# Patient Record
Sex: FEMALE | Race: WHITE
[De-identification: names, ages, dates, MRNs, and addresses within clinical notes are randomized per-mention and may not be internally consistent; named-entity substitution may affect disease eponyms.]

---

## 2017-01-06 ENCOUNTER — HOSPITAL ENCOUNTER (OUTPATIENT)
Age: 67
Discharge: HOME | End: 2017-01-06
Payer: MEDICARE

## 2017-01-06 DIAGNOSIS — Z12.31: Primary | ICD-10-CM

## 2017-06-02 ENCOUNTER — HOSPITAL ENCOUNTER (OUTPATIENT)
Dept: HOSPITAL 76 - DI | Age: 67
Discharge: HOME | End: 2017-06-02
Attending: INTERNAL MEDICINE
Payer: MEDICARE

## 2017-06-02 DIAGNOSIS — R05: Primary | ICD-10-CM

## 2017-06-02 PROCEDURE — 71020: CPT

## 2017-06-02 NOTE — XRAY REPORT
TWO VIEW CHEST: 06/02/2017

 

CLINICAL INDICATION: Cough. 

 

COMPARISON: 06/15/2015. 

 

FINDINGS:  Frontal and lateral views of the chest demonstrate a normal cardiac silhouette. The lungs 
remain clear. No effusion or pneumothorax is present. 

 

IMPRESSION:  NORMAL CHEST, UNCHANGED.

 

 

 

DD:06/02/2017 16:14:46  DT: 06/02/2017 16:27  JOB #: [id number]  EXT JOB #:[id number]

## 2017-06-14 ENCOUNTER — HOSPITAL ENCOUNTER (OUTPATIENT)
Dept: HOSPITAL 76 - RT | Age: 67
Discharge: HOME | End: 2017-06-14
Attending: INTERNAL MEDICINE
Payer: MEDICARE

## 2017-06-14 DIAGNOSIS — R06.2: Primary | ICD-10-CM

## 2017-06-14 PROCEDURE — 94060 EVALUATION OF WHEEZING: CPT

## 2017-06-14 RX ADMIN — ALBUTEROL SULFATE ONE MG: 2.5 SOLUTION RESPIRATORY (INHALATION) at 13:30

## 2018-04-18 ENCOUNTER — HOSPITAL ENCOUNTER (OUTPATIENT)
Dept: HOSPITAL 76 - LAB | Age: 68
Discharge: HOME | End: 2018-04-18
Attending: INTERNAL MEDICINE
Payer: MEDICARE

## 2018-04-18 DIAGNOSIS — R10.9: ICD-10-CM

## 2018-04-18 DIAGNOSIS — Z87.442: ICD-10-CM

## 2018-04-18 DIAGNOSIS — R39.15: Primary | ICD-10-CM

## 2018-04-18 DIAGNOSIS — Z79.899: ICD-10-CM

## 2018-04-18 LAB
CREAT SERPLBLD-SCNC: 0.6 MG/DL (ref 0.4–1)
GFRSERPLBLD MDRD-ARVRAT: 99 ML/MIN/{1.73_M2} (ref 89–?)

## 2018-04-18 PROCEDURE — 74178 CT ABD&PLV WO CNTR FLWD CNTR: CPT

## 2018-04-18 PROCEDURE — 36415 COLL VENOUS BLD VENIPUNCTURE: CPT

## 2018-04-18 PROCEDURE — 82565 ASSAY OF CREATININE: CPT

## 2018-04-18 NOTE — CT REPORT
CT IVP:  04/18/2018

 

CLINICAL INDICATION:  Pain, urgency of urination.

 

COMPARISON:  CT 01/11/2012.

 

TECHNIQUE:  Axial CT images of the abdomen and pelvis were obtained prior to and following 100 

mL Isovue 300 intravenously, using split bolus technique.

 

FINDINGS:  Limited evaluation of the lung bases is unremarkable.

 

ABDOMEN:  On the unenhanced images, there are multiple left renal calculi, measuring up to 1.2 

cm without evidence of hydronephrosis.  No hydroureter is seen.  Both kidneys demonstrate 

parapelvic and cortical cysts.  No solid renal mass or collecting system lesion is seen.  

Allowing for the phase of contrast enhancement, the liver, spleen, pancreas and adrenal glands 

are unremarkable.  The gallbladder demonstrates calculi.  No bowel dilatation, free gas, or 

free fluid is present.  No abdominal adenopathy is seen.

 

PELVIS:  Sigmoid diverticulosis is present, without CT evidence of diverticulitis.  The distal 

ureters are not dilated.  The visualized urinary bladder is unremarkable.  The patient is 

status post hysterectomy.  No pelvic adenopathy or free fluid is seen.

 

Osseous structures demonstrate degenerative changes.

 

IMPRESSION:  MULTIPLE LEFT RENAL CALCULI, WITHOUT EVIDENCE OF HYDRONEPHROSIS.   CHOLELITHIASIS.

 

CT DOSE REDUCTION STATEMENT

 

In accordance with CT protocol optimization, one or more of the following dose reduction 

techniques were utilized for this exam:  automated exposure control, adjustment of mA and/or KV

based on patient size, or use of iterative reconstructive technique.

 

 

DD: 04/18/2018 13:57

TD: 04/18/2018 14:14

Job #: [id number]

## 2018-05-29 ENCOUNTER — HOSPITAL ENCOUNTER (OUTPATIENT)
Dept: HOSPITAL 76 - DI | Age: 68
Discharge: HOME | End: 2018-05-29
Attending: UROLOGY
Payer: MEDICARE

## 2018-05-29 DIAGNOSIS — N20.0: Primary | ICD-10-CM

## 2018-05-29 PROCEDURE — 74018 RADEX ABDOMEN 1 VIEW: CPT

## 2018-05-29 NOTE — XRAY REPORT
SUPINE ABDOMEN:  05/29/2018

 

CLINICAL INDICATION:  Left nephrolithiasis.

 

COMPARISON:  CT 04/18/2018.

 

FINDINGS:  Supine views of the abdomen demonstrate a normal bowel gas pattern.  Left 

nephrolithiasis is again seen, with the largest stone, in the upper pole of the left kidney, 

measuring 10 mm.  No definite right nephrolithiasis is appreciated.

 

IMPRESSION:  LEFT NEPHROLITHIASIS.

 

 

DD: 05/29/2018 15:45

TD: 05/29/2018 15:52

Job #: [id number]

## 2018-07-10 ENCOUNTER — HOSPITAL ENCOUNTER (OUTPATIENT)
Dept: HOSPITAL 76 - DI | Age: 68
Discharge: HOME | End: 2018-07-10
Attending: PHYSICIAN ASSISTANT
Payer: MEDICARE

## 2018-07-10 DIAGNOSIS — N13.30: ICD-10-CM

## 2018-07-10 DIAGNOSIS — N20.0: Primary | ICD-10-CM

## 2018-07-10 PROCEDURE — 76770 US EXAM ABDO BACK WALL COMP: CPT

## 2018-07-11 NOTE — ULTRASOUND REPORT
Procedure Date:  07/10/2018   

Accession Number:  [id number] / [id number]                    

Procedure:  US  - Retroperitoneal CPT Code:  

 

FULL RESULT:

 

 

EXAM: Retroperitoneal

 

DATE: 7/10/2018 7:10 PM

 

CLINICAL HISTORY: KIDNEY STONE

 

COMPARISON: CT 4/18/2018

 

TECHNIQUE: Real-time scanning was performed with static images obtained.

 

FINDINGS:

Right Kidney: 13.0 x 6.6 x 5.6 cm.  Mild hydronephrosis. No definite 

shadowing calculus.

 

Left Kidney: 13.0 x 6.4 x 5.6 cm.  Mild hydronephrosis. No definite 

shadowing calculus.

 

Bladder: Bilateral jets seen. The prevoid bladder volume was 262 cc. The 

postvoid bladder volume was 13 cc.

 

IMPRESSION:

Mild bilateral hydronephrosis. No definite shadowing calculus is 

identified.

 

[facility]

## 2021-04-13 ENCOUNTER — HOSPITAL ENCOUNTER (OUTPATIENT)
Dept: HOSPITAL 76 - DI | Age: 71
Discharge: HOME | End: 2021-04-13
Attending: INTERNAL MEDICINE
Payer: MEDICARE

## 2021-04-13 DIAGNOSIS — Z12.31: Primary | ICD-10-CM

## 2021-04-14 NOTE — MAMMOGRAPHY REPORT
BILATERAL DIGITAL SCREENING MAMMOGRAM 3D/2D: 4/13/2021

 

CLINICAL: Routine screening.  

 

Comparison is made to exams dated:  1/6/2017 mammogram and 11/14/2014 mammogram - [facility].  There are scattered fibroglandular elements in both breasts.  

 

No significant masses, calcifications, or other findings are seen in either breast.  

There has been no significant interval change.

 

IMPRESSION: NEGATIVE

There is no mammographic evidence of malignancy. A 1 year screening mammogram is recommended.  

 

 

This exam was interpreted at Station ID: [id number].  

 

NOTE: For mammograms, a report in lay terms will be sent to the patient. Approximately 15% of breast 
malignancies will not be visualized mammographically. In the management of a palpable breast mass, a 
negative mammogram must not discourage biopsy of a clinically suspicious lesion.

 

Electronically Signed By: Elroy Rosales M.D.          

ar/penrad:4/13/2021 13:59:31  

 

 

 

ACR BI-RADS Category 1: Negative [id number]

PARENCHYMAL PATTERN: (A) - The breast(s) demonstrate(s) scattered fibroglandular densities.

BI-RADS CATEGORY: (1) - 1

RECOMMENDATION: (ANNUAL)  - Recommend routine annual screening mammography.

[id number]

1 year screening

LATERALITY: (B)

## 2021-11-30 ENCOUNTER — HOSPITAL ENCOUNTER (OUTPATIENT)
Dept: HOSPITAL 76 - DI | Age: 71
Discharge: HOME | End: 2021-11-30
Attending: INTERNAL MEDICINE
Payer: MEDICARE

## 2021-11-30 DIAGNOSIS — R22.41: Primary | ICD-10-CM

## 2021-11-30 NOTE — ULTRASOUND REPORT
PROCEDURE:  Duplex Ext Veins Right

 

INDICATIONS:  EDEMA RIGHT LOWER EXTREMITY

 

TECHNIQUE:  

Real-time imaging, as well as color and pulse Doppler interrogation, were performed of the lower extr
emity deep veins from the inguinal ligament to the popliteal fossa.  

 

COMPARISON:  None.

 

FINDINGS:  The deep veins are normally compressible, and free of intraluminal thrombus.  Color and pu
lse Doppler demonstrate normal phasic intraluminal flow.  There is normal augmentation response to di
stal compression maneuver.  

 

IMPRESSION:  No sonographic evidence of DVT. Complex fluid collection in the right medial calf is non
specific but could represent pooled edema. The collection is considered unlikely to represent abscess
.

 

Reviewed by: Srinath Tijerina MD on 11/30/2021 4:00 PM [facility]

Approved by: Srinath Tijerina MD on 11/30/2021 4:00 PM [facility]

 

 

Station ID:  [id number]

## 2024-03-18 ENCOUNTER — HOSPITAL ENCOUNTER (OUTPATIENT)
Dept: HOSPITAL 76 - SDS | Age: 74
Discharge: HOME | End: 2024-03-18
Attending: SURGERY
Payer: MEDICARE

## 2024-03-18 VITALS — DIASTOLIC BLOOD PRESSURE: 60 MMHG | SYSTOLIC BLOOD PRESSURE: 135 MMHG | OXYGEN SATURATION: 97 %

## 2024-03-18 DIAGNOSIS — E66.9: ICD-10-CM

## 2024-03-18 DIAGNOSIS — D12.4: ICD-10-CM

## 2024-03-18 DIAGNOSIS — Z87.891: ICD-10-CM

## 2024-03-18 DIAGNOSIS — Z12.11: Primary | ICD-10-CM

## 2024-03-18 DIAGNOSIS — D12.2: ICD-10-CM

## 2024-03-18 DIAGNOSIS — K57.30: ICD-10-CM

## 2024-03-18 DIAGNOSIS — K63.5: ICD-10-CM

## 2024-03-18 PROCEDURE — 45380 COLONOSCOPY AND BIOPSY: CPT

## 2024-03-18 PROCEDURE — 0DBP8ZX EXCISION OF RECTUM, VIA NATURAL OR ARTIFICIAL OPENING ENDOSCOPIC, DIAGNOSTIC: ICD-10-PCS | Performed by: SURGERY

## 2024-03-18 PROCEDURE — 0DBM8ZX EXCISION OF DESCENDING COLON, VIA NATURAL OR ARTIFICIAL OPENING ENDOSCOPIC, DIAGNOSTIC: ICD-10-PCS | Performed by: SURGERY

## 2024-03-18 PROCEDURE — 0DBK8ZX EXCISION OF ASCENDING COLON, VIA NATURAL OR ARTIFICIAL OPENING ENDOSCOPIC, DIAGNOSTIC: ICD-10-PCS | Performed by: SURGERY

## 2024-03-18 RX ADMIN — SODIUM CHLORIDE, POTASSIUM CHLORIDE, SODIUM LACTATE AND CALCIUM CHLORIDE ONE MLS: 600; 310; 30; 20 INJECTION, SOLUTION INTRAVENOUS at 08:01

## 2024-03-18 RX ADMIN — SODIUM CHLORIDE, POTASSIUM CHLORIDE, SODIUM LACTATE AND CALCIUM CHLORIDE ONE MLS: 600; 310; 30; 20 INJECTION, SOLUTION INTRAVENOUS at 06:39

## 2024-03-18 NOTE — ANESTHESIA POST OP EVALUATION
Anesthesia Post Eval





- Post Anesthesia Eval


Vitals: 





                                Last Vital Signs











Temp  36.0 C L  03/18/24 06:31


 


Pulse  75   03/18/24 06:31


 


Resp  18   03/18/24 06:31


 


BP  150/84 H  03/18/24 06:31


 


Pulse Ox  97   03/18/24 06:31


 


O2 Flow Rate      











CV Function Including HR & BP: Stable


Pain Control: Satisfactory


Nausea & Vomiting: Negative


Mental Status: Baseline


Respiratory Status: Airway Patent


Hydration Status: Satisfactory


Anesthesia Complications: None

## 2024-03-18 NOTE — ANESTHESIA
Pre-Anesthesia VS, & Labs





- Diagnosis





history of polyps





- Procedure





colonoscopy


Vital Signs: 





                                        











Temp Pulse Resp BP Pulse Ox O2 Flow Rate


 


 36.0 C L  75   18   150/84 H  97    


 


 03/18/24 06:31  03/18/24 06:31  03/18/24 06:31  03/18/24 06:31  03/18/24 06:31 

 











Height: 5 ft 8 in


Weight (kg): 91 kg


Body Mass Index: 30.4


BMI Classification: Obese





- NPO


Other (prep as directed, fisnished at 530am)





- Pregnancy


Is Patient Pregnant?: No





Home Medications and Allergies





                                        





Multivitamin [Multivitamins] 1 tab ORAL DAILY 03/13/15 








Allergies/Adverse Reactions: 


                                    Allergies











Allergy/AdvReac Type Severity Reaction Status Date / Time


 


codeine AdvReac  Nausea Verified 03/15/24 13:35














Anes History & Medical History





- Anesthetic History


Anesthesia Complications: reports: No previous complications





- Medical History


Cardiovascular: reports: None


Pulmonary: reports: None


Gastrointestinal: reports: None


Urinary: reports: Kidney stones


Musculoskeletal: reports: Osteoarthritis


Endocrine/Autoimmune: reports: None


Skin: reports: None





- Surgical History


Urologic: reports: Kidney stents, Ureterolithotomy (stones)


Gynecologic: reports: Hysterectomy





Exam


General: Alert, Oriented x3


Dental: WNL


Mouth Opening: Greater than 4 Fingerbreadths


Neck Mobility: Normal


Mallampati classification: II


Thyromental Distance: greater than 6 cm


Respiratory: Lungs clear


Cardiovascular: Regular rate





Plan


Anesthesia Type: Total IV


Consent for Procedure(s) Verified and Reviewed: Yes


Code Status: Attempt Resuscitation


ASA classification: 2-Mild systemic disease


Is this case an emergency?: No

## 2024-08-30 ENCOUNTER — HOSPITAL ENCOUNTER (EMERGENCY)
Dept: HOSPITAL 76 - ED | Age: 74
Discharge: HOME | End: 2024-08-30
Payer: MEDICARE

## 2024-08-30 VITALS — SYSTOLIC BLOOD PRESSURE: 156 MMHG | DIASTOLIC BLOOD PRESSURE: 94 MMHG | OXYGEN SATURATION: 98 %

## 2024-08-30 DIAGNOSIS — L03.011: Primary | ICD-10-CM
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
## 2024-08-30 PROCEDURE — 90471 IMMUNIZATION ADMIN: CPT

## 2024-08-30 PROCEDURE — 90715 TDAP VACCINE 7 YRS/> IM: CPT

## 2024-08-30 PROCEDURE — 99283 EMERGENCY DEPT VISIT LOW MDM: CPT

## 2024-08-30 RX ADMIN — AMOXICILLIN AND CLAVULANATE POTASSIUM STA TAB: 875; 125 TABLET, FILM COATED ORAL at 08:57

## 2024-08-30 RX ADMIN — TETANUS TOXOID, REDUCED DIPHTHERIA TOXOID AND ACELLULAR PERTUSSIS VACCINE, ADSORBED ONE ML: 5; 2.5; 8; 8; 2.5 SUSPENSION INTRAMUSCULAR at 08:57

## 2024-08-30 NOTE — ED PHYSICIAN DOCUMENTATION
History of Present Illness





- Stated complaint


Stated Complaint: DOG BITE,RT FINGER INJ





- Chief complaint


Chief Complaint: Trauma Ext





- History obtained from


History obtained from: Patient





- History of Present Illness


Timing: How many days ago (2)


Pain level max: 5


Pain level now: 4





- Additonal information


Additional information: 





74-year-old right-handed female presents with a dog bite to the right fourth 

digit.  This occurred 2 days ago.  The dog was an elderly dog and was put down 

for a "stroke".  No history of rabies. Patient noted worsening redness and 

swelling to the distal phalanx.  No palmar tenderness.  No drainage.





Review of Systems


Constitutional: denies: Fever, Chills


Respiratory: denies: Cough


Skin: denies: Rash





PD PAST MEDICAL HISTORY





- Past Medical History


Past Medical History: No


Cardiovascular: None


Respiratory: None


Endocrine/Autoimmune: None


GI: None


: Kidney stones


HEENT: None


Psych: None


Musculoskeletal: Osteoarthritis


Derm: None





- Past Surgical History


Past Surgical History: No


/GYN: Hysterectomy





- Present Medications


Home Medications: 


                                Ambulatory Orders











 Medication  Instructions  Recorded  Confirmed


 


Multivitamin [Multivitamins] 1 tab ORAL DAILY 03/13/15 03/18/24


 


Amox/Clav 875/125 [Augmentin] 1 tab PO Q12H #14 tablet 08/30/24 














- Allergies


Allergies/Adverse Reactions: 


                                    Allergies











Allergy/AdvReac Type Severity Reaction Status Date / Time


 


codeine AdvReac  Nausea Verified 08/30/24 08:47














- Social History


Does the pt smoke?: No


Smoking Status: Never smoker





PD ED PE NORMAL





- Vitals


Vital signs reviewed: Yes





- General


General: Alert and oriented X 3, No acute distress





- HEENT


HEENT: Moist mucous membranes





- Derm


Derm: Warm and dry





- Extremities


Extremities: Other (R hand - 4th digit - Mild swelling and erythema to the 

distal phalanx just distal to the dip joint. no palmar tenderness, no tendon 

tenderness. NVI. no pulp TTP or swelling.)





- Neuro


Neuro: Alert and oriented X 3





- Psych


Psych: Normal mood, Normal affect





Results





- Vitals


Vitals: 


                               Vital Signs - 24 hr











  08/30/24





  08:41


 


Temperature 35.9 C L


 


Heart Rate 80


 


Respiratory 18





Rate 


 


Blood Pressure 156/94 H


 


O2 Saturation 98








                                     Oxygen











O2 Source                      Room air

















PD Medical Decision Making





- ED course


Complexity details: considered differential, d/w patient


ED course: 





74-year-old female with a dog bite to the right fourth digit.  No evidence of 

deep space infection in the hand.  No evidence of infection in the joint.  No 

evidence of tendon infection.  Will place on Augmentin.  Given Tdap.  Patient 

counseled regarding signs and symptoms for which I believe and urgent re-

evaluation would be necessary. Patient with good understanding of and agreement 

to plan and is comfortable going home at this time





This document was made in part using voice recognition software. While efforts 

are made to proofread this document, sound alike and grammatical errors may 

occur.





Departure





- Departure


Disposition: 01 Home, Self Care


Clinical Impression: 


Dog bite


Qualifiers:


 Encounter type: initial encounter Qualified Code(s): W54.0XXA - Bitten by dog, 

initial encounter





Cellulitis


Qualifiers:


 Site of cellulitis: extremity Site of cellulitis of extremity: finger 

Laterality: right Qualified Code(s): L03.011 - Cellulitis of right finger





Condition: Good


Instructions:  ED Infec Skin Cellulitis, ED Bite Dog


Follow-Up: 


Rosemarie Pedersen MD [Primary Care Provider] - 


Prescriptions: 


Amox/Clav 875/125 [Augmentin] 1 tab PO Q12H #14 tablet


Comments: 


Take all antibiotics until gone.  Please follow-up with your doctor in 4 to 5 

days for a wound check.  Return here if you worsen including pain and swelling 

traveling up the hand or into the palm of the hand.  You were given a tetanus 

shot today as well.  Your prescriptions were sent to the [facility]

 pharmacy.


Forms:  PCP List

## 2024-09-02 ENCOUNTER — HOSPITAL ENCOUNTER (EMERGENCY)
Dept: HOSPITAL 76 - ED | Age: 74
Discharge: HOME | End: 2024-09-02
Payer: MEDICARE

## 2024-09-02 VITALS — OXYGEN SATURATION: 96 % | SYSTOLIC BLOOD PRESSURE: 149 MMHG | DIASTOLIC BLOOD PRESSURE: 84 MMHG

## 2024-09-02 DIAGNOSIS — L03.011: ICD-10-CM

## 2024-09-02 DIAGNOSIS — Z79.899: ICD-10-CM

## 2024-09-02 DIAGNOSIS — S61.254D: Primary | ICD-10-CM
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
## 2024-09-02 LAB
ALBUMIN DIAFP-MCNC: 4 G/DL (ref 3.2–5.5)
ALBUMIN/GLOB SERPL: 1.4 {RATIO} (ref 1–2.2)
ALP SERPL-CCNC: 77 IU/L (ref 42–121)
ALT SERPL W P-5'-P-CCNC: 17 IU/L (ref 10–60)
ANION GAP SERPL CALCULATED.4IONS-SCNC: 7 MMOL/L (ref 6–13)
AST SERPL W P-5'-P-CCNC: 16 IU/L (ref 10–42)
BASOPHILS NFR BLD AUTO: 0 10^3/UL (ref 0–0.1)
BASOPHILS NFR BLD AUTO: 0.6 %
BILIRUB BLD-MCNC: 0.4 MG/DL (ref 0.2–1)
BUN SERPL-MCNC: 12 MG/DL (ref 6–20)
CALCIUM UR-MCNC: 9.1 MG/DL (ref 8.5–10.3)
CHLORIDE SERPL-SCNC: 105 MMOL/L (ref 101–111)
CO2 SERPL-SCNC: 27 MMOL/L (ref 21–32)
CREAT SERPLBLD-SCNC: 0.7 MG/DL (ref 0.6–1.3)
EOSINOPHIL # BLD AUTO: 0.1 10^3/UL (ref 0–0.7)
EOSINOPHIL NFR BLD AUTO: 1.8 %
ERYTHROCYTE [DISTWIDTH] IN BLOOD BY AUTOMATED COUNT: 13.2 % (ref 12–15)
GFRSERPLBLD MDRD-ARVRAT: 81 ML/MIN/{1.73_M2} (ref 89–?)
GLOBULIN SER-MCNC: 2.9 G/DL (ref 2.1–4.2)
GLUCOSE SERPL-MCNC: 100 MG/DL (ref 74–104)
HCT VFR BLD AUTO: 44 % (ref 37–47)
HGB UR QL STRIP: 14.3 G/DL (ref 12–16)
LYMPHOCYTES # SPEC AUTO: 1.6 10^3/UL (ref 1.5–3.5)
LYMPHOCYTES NFR BLD AUTO: 25.8 %
MCH RBC QN AUTO: 31.2 PG (ref 27–31)
MCHC RBC AUTO-ENTMCNC: 32.5 G/DL (ref 32–36)
MCV RBC AUTO: 95.9 FL (ref 81–99)
MONOCYTES # BLD AUTO: 0.4 10^3/UL (ref 0–1)
MONOCYTES NFR BLD AUTO: 5.6 %
NEUTROPHILS # BLD AUTO: 4.1 10^3/UL (ref 1.5–6.6)
NEUTROPHILS # SNV AUTO: 6.2 X10^3/UL (ref 4.8–10.8)
NEUTROPHILS NFR BLD AUTO: 65.7 %
NRBC # BLD AUTO: 0 /100WBC
NRBC # BLD AUTO: 0 X10^3/UL
PDW BLD AUTO: 9.8 FL (ref 7.9–10.8)
PLATELET # BLD: 237 10^3/UL (ref 130–450)
POTASSIUM SERPL-SCNC: 4.1 MMOL/L (ref 3.5–4.5)
PROT SPEC-MCNC: 6.9 G/DL (ref 6.4–8.9)
RBC MAR: 4.59 10^6/UL (ref 4.2–5.4)
SODIUM SERPLBLD-SCNC: 139 MMOL/L (ref 135–145)

## 2024-09-02 PROCEDURE — 85025 COMPLETE CBC W/AUTO DIFF WBC: CPT

## 2024-09-02 PROCEDURE — 96367 TX/PROPH/DG ADDL SEQ IV INF: CPT

## 2024-09-02 PROCEDURE — 96375 TX/PRO/DX INJ NEW DRUG ADDON: CPT

## 2024-09-02 PROCEDURE — 96365 THER/PROPH/DIAG IV INF INIT: CPT

## 2024-09-02 PROCEDURE — 73140 X-RAY EXAM OF FINGER(S): CPT

## 2024-09-02 PROCEDURE — 99284 EMERGENCY DEPT VISIT MOD MDM: CPT

## 2024-09-02 PROCEDURE — 80053 COMPREHEN METABOLIC PANEL: CPT

## 2024-09-02 PROCEDURE — 36415 COLL VENOUS BLD VENIPUNCTURE: CPT

## 2024-09-02 PROCEDURE — 99283 EMERGENCY DEPT VISIT LOW MDM: CPT

## 2024-09-02 RX ADMIN — SODIUM CHLORIDE STA MLS/HR: 9 INJECTION, SOLUTION INTRAVENOUS at 17:24

## 2024-09-02 RX ADMIN — BACITRACIN STA PACKET: 500 OINTMENT TOPICAL at 19:46

## 2024-09-02 RX ADMIN — SODIUM CHLORIDE STA MLS/HR: 9 INJECTION, SOLUTION INTRAVENOUS at 16:54

## 2024-09-02 NOTE — XRAY REPORT
PROCEDURE:  Finger(s) RT

 

INDICATIONS:  fourth finger concern for infection

 

TECHNIQUE:  AP hand, 2 views of the fourth finger(s) acquired.  

 

COMPARISON:  None.

 

FINDINGS:  

 

Bones:  No fractures or dislocations.  No osseous erosion, ostial lysis or periosteal reaction. No villarreal
spicious bony lesions.  

 

Soft tissues:  No suspicious soft tissue calcifications or masses.   No radiopaque foreign body.

 

IMPRESSION:  

1.No acute bony abnormality.

2.No radiographic evidence of acute osteomyelitis; however, radiograph is insensitive in the first 14
 days. If there is high clinical suspicion, recommend MRI for further evaluation.

 

 

Reviewed by: Antonio Bates MD on 9/2/2024 1:42 PM [facility]

Approved by: Antonio Bates MD on 9/2/2024 1:42 PM [facility]

 

 

Station ID:  [id number]

## 2024-09-02 NOTE — ED PHYSICIAN DOCUMENTATION
History of Present Illness





- Stated complaint


Stated Complaint: RT FINGER DOG BITE





- Chief complaint


Chief Complaint: Wound





- Additonal information


Additional information: 


Patient is a 74-year-old female presenting to the emergency department with 

right fourth digit pain.  Patient was bit by a dog on 8/28.  She was seen here 

on 8/30 started on antibiotics and discharged home.  Patient notes she was 

feeling fine until about 1 day ago when she noticed worsening redness and 

discharge from the wounds.  She notes she has been compliant with the 

antibiotics.  She denies any fevers or chills associated with symptoms.  She 

notes worsening pain with slight movement of her right fourth digit.  Patient is

right-handed.  Tetanus was last updated on 8/30/2024.  Patient notes no concerns

for rabies as the dog was put down and was vaccinated.








PD PAST MEDICAL HISTORY





- Past Medical History


Cardiovascular: None


Respiratory: None


Endocrine/Autoimmune: None


GI: None


: Kidney stones


HEENT: None


Psych: None


Musculoskeletal: Osteoarthritis


Derm: None





- Past Surgical History


Past Surgical History: No


/GYN: Hysterectomy





- Present Medications


Home Medications: 


                                Ambulatory Orders











 Medication  Instructions  Recorded  Confirmed


 


Multivitamin [Multivitamins] 1 tab ORAL DAILY 03/13/15 09/02/24


 


Amox/Clav 875/125 [Augmentin] 1 tab PO Q12H #14 tablet 08/30/24 09/02/24


 


Bacitracin Zinc Oint 1 applic TOP BID #1 each 09/02/24 


 


Sulfamethox/Trimeth 800/160 1 each PO BID #14 tablet 09/02/24 





[Bactrim Ds 800/160]   














- Allergies


Allergies/Adverse Reactions: 


                                    Allergies











Allergy/AdvReac Type Severity Reaction Status Date / Time


 


vancomycin Allergy  Rash Verified 09/02/24 18:28


 


codeine AdvReac  Nausea Verified 09/02/24 11:23














- Social History


Does the pt smoke?: No


Smoking Status: Never smoker





PD ED PE NORMAL





- Vitals


Vital signs reviewed: Yes





- General


General: Alert and oriented X 3





- HEENT


HEENT: Atraumatic





- Neck


Neck: Supple, no meningeal sign





- Cardiac


Cardiac: RRR, No murmur, No gallop, No rub





- Respiratory


Respiratory: No respiratory distress, Clear bilaterally





Results





- Vitals


Vitals: 


                               Vital Signs - 24 hr











  09/02/24





  11:23


 


Temperature 36.7 C


 


Heart Rate 85


 


Respiratory 16





Rate 


 


Blood Pressure 149/84 H


 


O2 Saturation 96








                                     Oxygen











O2 Source                      Room air

















- Labs


Labs: 


                                Laboratory Tests











  09/02/24 09/02/24





  13:57 13:57


 


WBC  6.2 


 


RBC  4.59 


 


Hgb  14.3 


 


Hct  44.0 


 


MCV  95.9 


 


MCH  31.2 H 


 


MCHC  32.5 


 


RDW  13.2 


 


Plt Count  237 


 


MPV  9.8 


 


Neut # (Auto)  4.1 


 


Lymph # (Auto)  1.6 


 


Mono # (Auto)  0.4 


 


Eos # (Auto)  0.1 


 


Baso # (Auto)  0.0 


 


Absolute Nucleated RBC  0.00 


 


Nucleated RBC %  0.0 


 


Sodium   139


 


Potassium   4.1


 


Chloride   105


 


Carbon Dioxide   27


 


Anion Gap   7.0


 


BUN   12


 


Creatinine   0.7


 


Estimated GFR (MDRD)   81 L


 


Glucose   100


 


Calcium   9.1


 


Total Bilirubin   0.4


 


AST   16


 


ALT   17


 


Alkaline Phosphatase   77


 


Total Protein   6.9


 


Albumin   4.0


 


Globulin   2.9


 


Albumin/Globulin Ratio   1.4














Departure





- Departure


Disposition: 01 Home, Self Care


Clinical Impression: 


 Cellulitis of ring finger





Condition: Good


Instructions:  Bites Scratches Animal, ED Bite Animal General, ED Staph Infec 

Abx Tx Only


Follow-Up: 


Thom Elizondo MD [Physician No Access] - 


Comments: 


You were seen here in the emergency department for animal bite to right ring 

finger.  I have started you on more antibiotics to help with this infection 

apply topical antibiotics as prescribed.  Watch for any worsening redness sw

elling streaking or discharge from the wound.  I have given you the follow-up 

number for hand surgery if you are unable to get in please follow-up with your 

PCP.





[facility]


[phone] F/u at next available appointment


Forms:  PCP List